# Patient Record
Sex: FEMALE | Race: WHITE | Employment: FULL TIME | URBAN - METROPOLITAN AREA
[De-identification: names, ages, dates, MRNs, and addresses within clinical notes are randomized per-mention and may not be internally consistent; named-entity substitution may affect disease eponyms.]

---

## 2020-03-20 ENCOUNTER — HOSPITAL ENCOUNTER (EMERGENCY)
Age: 70
Discharge: HOME | End: 2020-03-21
Attending: EMERGENCY MEDICINE
Payer: MEDICARE

## 2020-03-20 VITALS
DIASTOLIC BLOOD PRESSURE: 67 MMHG | SYSTOLIC BLOOD PRESSURE: 105 MMHG | HEIGHT: 62 IN | TEMPERATURE: 97.4 F | BODY MASS INDEX: 27.6 KG/M2 | OXYGEN SATURATION: 96 % | WEIGHT: 150 LBS | RESPIRATION RATE: 16 BRPM | HEART RATE: 87 BPM

## 2020-03-20 DIAGNOSIS — S01.01XA LACERATION OF SCALP, INITIAL ENCOUNTER: ICD-10-CM

## 2020-03-20 DIAGNOSIS — S09.90XA INJURY OF HEAD, INITIAL ENCOUNTER: Primary | ICD-10-CM

## 2020-03-20 PROCEDURE — 99282 EMERGENCY DEPT VISIT SF MDM: CPT

## 2020-03-20 PROCEDURE — 90471 IMMUNIZATION ADMIN: CPT

## 2020-03-20 PROCEDURE — 75810000293 HC SIMP/SUPERF WND  RPR

## 2020-03-21 PROCEDURE — 75810000293 HC SIMP/SUPERF WND  RPR

## 2020-03-21 PROCEDURE — 77030008462 HC STPLR SKN PROX J&J -A

## 2020-03-21 PROCEDURE — 77030018836 HC SOL IRR NACL ICUM -A

## 2020-03-21 PROCEDURE — 90471 IMMUNIZATION ADMIN: CPT

## 2020-03-21 PROCEDURE — 74011250636 HC RX REV CODE- 250/636: Performed by: PHYSICIAN ASSISTANT

## 2020-03-21 PROCEDURE — 90715 TDAP VACCINE 7 YRS/> IM: CPT | Performed by: PHYSICIAN ASSISTANT

## 2020-03-21 PROCEDURE — 74011000250 HC RX REV CODE- 250

## 2020-03-21 PROCEDURE — 74011000250 HC RX REV CODE- 250: Performed by: PHYSICIAN ASSISTANT

## 2020-03-21 RX ORDER — LIDOCAINE HYDROCHLORIDE 10 MG/ML
15 INJECTION INFILTRATION; PERINEURAL
Status: COMPLETED | OUTPATIENT
Start: 2020-03-21 | End: 2020-03-21

## 2020-03-21 RX ORDER — LIDOCAINE HYDROCHLORIDE AND EPINEPHRINE 10; 10 MG/ML; UG/ML
INJECTION, SOLUTION INFILTRATION; PERINEURAL
Status: COMPLETED
Start: 2020-03-21 | End: 2020-03-21

## 2020-03-21 RX ORDER — LIDOCAINE HYDROCHLORIDE AND EPINEPHRINE 10; 10 MG/ML; UG/ML
1.5 INJECTION, SOLUTION INFILTRATION; PERINEURAL ONCE
Status: COMPLETED | OUTPATIENT
Start: 2020-03-21 | End: 2020-03-21

## 2020-03-21 RX ORDER — LIDOCAINE HYDROCHLORIDE 10 MG/ML
INJECTION INFILTRATION; PERINEURAL
Status: COMPLETED
Start: 2020-03-21 | End: 2020-03-21

## 2020-03-21 RX ADMIN — LIDOCAINE HYDROCHLORIDE 15 ML: 10 INJECTION INFILTRATION; PERINEURAL at 01:37

## 2020-03-21 RX ADMIN — LIDOCAINE HYDROCHLORIDE AND EPINEPHRINE: 10; 10 INJECTION, SOLUTION INFILTRATION; PERINEURAL at 01:38

## 2020-03-21 RX ADMIN — LIDOCAINE HYDROCHLORIDE 15 ML: 10 INJECTION, SOLUTION INFILTRATION; PERINEURAL at 01:37

## 2020-03-21 RX ADMIN — TETANUS TOXOID, REDUCED DIPHTHERIA TOXOID AND ACELLULAR PERTUSSIS VACCINE, ADSORBED 0.5 ML: 5; 2.5; 8; 8; 2.5 SUSPENSION INTRAMUSCULAR at 01:51

## 2020-03-21 RX ADMIN — LIDOCAINE HYDROCHLORIDE,EPINEPHRINE BITARTRATE: 10; .01 INJECTION, SOLUTION INFILTRATION; PERINEURAL at 01:38

## 2020-03-21 RX ADMIN — Medication 2 ML: at 01:03

## 2020-03-21 NOTE — ED PROVIDER NOTES
EMERGENCY DEPARTMENT HISTORY AND PHYSICAL EXAM    Date: 3/20/2020  Patient Name: Yordan Nance    History of Presenting Illness     Chief Complaint   Patient presents with    Laceration    Fall       History Provided By: Patient    Chief Complaint: head injury     Additional History (Context):   11:20 PM  Yordan Nance is a 81 y.o. female with PMHX HTN  presents to the emergency department C/O laceration to the scalp. Patient states she was walking to get into bed and somehow fell but does not remember how. She is unable to give any details of the fall. She does not believe that she had a loss of consciousness but again would not be able to tell me anything about the fall. She denies any headache but states the back of her head is sore. She denies any chest pain or shortness of breath prior to the episode or currently. Denies any blood thinner use. She states she has been taking Advil today because she felt strange. Tetanus unsure    PCP: Regis Perez MD      Past History     Past Medical History:  History reviewed. No pertinent past medical history. Past Surgical History:  History reviewed. No pertinent surgical history. Family History:  History reviewed. No pertinent family history. Social History:  Social History     Tobacco Use    Smoking status: Never Smoker   Substance Use Topics    Alcohol use: Yes     Comment: socially    Drug use: Never       Allergies: Allergies   Allergen Reactions    Morphine Other (comments)     Pt states cannot tolerate         Review of Systems   Review of Systems   Constitutional: Negative for chills and fever. Eyes: Negative for visual disturbance. Respiratory: Negative for shortness of breath. Cardiovascular: Negative for chest pain. Gastrointestinal: Negative for abdominal pain, diarrhea, nausea and vomiting. Musculoskeletal: Negative for arthralgias, back pain and neck pain. Skin: Positive for wound.    Neurological: Negative for dizziness, syncope, weakness, light-headedness, numbness and headaches. All other systems reviewed and are negative. Physical Exam     Vitals:    03/20/20 2312   BP: 105/67   Pulse: 87   Resp: 16   Temp: 97.4 °F (36.3 °C)   SpO2: 96%   Weight: 68 kg (150 lb)   Height: 5' 2\" (1.575 m)     Physical Exam  Vitals signs and nursing note reviewed. Constitutional:       General: She is not in acute distress. Appearance: She is well-developed. Comments: Alert, oriented, nontoxic, stable gait   HENT:      Head: Normocephalic and atraumatic. Comments: Large hematoma to the posterior scalp with a 2.5 cm laceration overlying, no bony instability, no raccoon eyes or jordan signs     Right Ear: Tympanic membrane, ear canal and external ear normal. Tympanic membrane is not perforated, erythematous, retracted or bulging. Left Ear: Tympanic membrane, ear canal and external ear normal. Tympanic membrane is not perforated, erythematous, retracted or bulging. Nose: Nose normal. No mucosal edema or rhinorrhea. Right Sinus: No maxillary sinus tenderness or frontal sinus tenderness. Left Sinus: No maxillary sinus tenderness or frontal sinus tenderness. Mouth/Throat:      Mouth: Mucous membranes are not dry. Pharynx: Uvula midline. No oropharyngeal exudate, posterior oropharyngeal erythema or uvula swelling. Tonsils: No tonsillar abscesses. Neck:      Musculoskeletal: Normal range of motion and neck supple. Comments: C-spine nontender to palpation, no crepitus or step-off, full range of motion  Cardiovascular:      Rate and Rhythm: Normal rate and regular rhythm. Heart sounds: Normal heart sounds. No murmur. Pulmonary:      Effort: Pulmonary effort is normal. No respiratory distress. Breath sounds: Normal breath sounds. No wheezing or rales. Abdominal:      General: Bowel sounds are normal.      Palpations: Abdomen is soft. Tenderness:  There is no abdominal tenderness. Musculoskeletal:      Comments: Back nontender to palpation, no crepitus or step-off, full range of motion, moving all extremities equally   Lymphadenopathy:      Cervical: No cervical adenopathy. Skin:     General: Skin is warm and dry. Findings: No rash. Neurological:      Mental Status: She is alert and oriented to person, place, and time. Psychiatric:         Judgment: Judgment normal.         Diagnostic Study Results     Labs:   No results found for this or any previous visit (from the past 12 hour(s)). Radiologic Studies:   No orders to display     CT Results  (Last 48 hours)    None        CXR Results  (Last 48 hours)    None          Medical Decision Making   I am the first provider for this patient. I reviewed the vital signs, available nursing notes, past medical history, past surgical history, family history and social history. Vital Signs: Reviewed the patient's vital signs. Pulse Oximetry Analysis: 96% on RA       Records Reviewed: Nursing Notes and Old Medical Records    Procedures:  Wound Repair  Date/Time: 3/21/2020 6:16 PM  Performed by: PAPreparation: skin prepped with Shur-Clens  Pre-procedure re-eval: Immediately prior to the procedure, the patient was reevaluated and found suitable for the planned procedure and any planned medications. Time out: Immediately prior to the procedure a time out was called to verify the correct patient, procedure, equipment, staff and marking as appropriate. .  Location details: scalp  Wound length:2.5 cm or less  Anesthesia: local infiltration    Anesthesia:  Local Anesthetic: lidocaine 1% with epinephrine  Anesthetic total: 9 mL  Foreign bodies: no foreign bodies  Irrigation solution: saline  Irrigation method: syringe  Debridement: none  Skin closure: staples  Number of sutures: 8  Approximation: close  Dressing: 4x4  Patient tolerance: Patient tolerated the procedure well with no immediate complications  My total time at bedside, performing this procedure was 1-15 minutes. ED Course:   11:20 PM Initial assessment performed. The patients presenting problems have been discussed, and they are in agreement with the care plan formulated and outlined with them. I have encouraged them to ask questions as they arise throughout their visit. I informed the pt that She needed further lab evaluation for adequate evaluation for her symptoms, diagnosis, that by refusing workup She is at risk for possible intracranial bleed, MI cardiac arrest, metabolic derangement, anemia, permanent disability death. She is awake, alert, She understands her condition and the risks involved in leaving. Patient is clinically aware of their surroundings and able to ask appropriate questions, the patients patient and the nurse present confirms She is not clinically intoxicated and able to make medical decisions. She verbalized knowing the same risks and understood it was recommended that She stay and could also return at any time. she verbalized that She understood both diagnosis, risks and could return at any time. They were both provided with warnings regarding worsening of her condition and were provided instructions to follow up with Tom Lopez MD tomorrow or return to the Emergency Room as soon as possible. This discussion was witnessed by ЮЛИЯ Morgan. Discussion:  Pt presents with head injury after a fall. Patient states she is unsure of why or how she fell. She had a large hematoma to the posterior scalp with a laceration overlying. No obvious intracranial bleed evidence of skull fracture. Recommended head CT however patient declined. Also recommended labs and EKG to rule out any potential life-threatening causes of syncope since she was unable to give details of why she fell. She is alert and oriented x4 is not intoxicated and is competent to make medical decisions.   Discussed risk versus benefits of declining work-up and imaging, patient understands and still would not like any imaging or blood work. Patient agreeable to staples and tetanus. . Strict return precautions given, pt offering no questions or complaints. Diagnosis and Disposition     DISCHARGE NOTE:  Shaina Root  results have been reviewed with her. She has been counseled regarding her diagnosis, treatment, and plan. She verbally conveys understanding and agreement of the signs, symptoms, diagnosis, treatment and prognosis and additionally agrees to follow up as discussed. She also agrees with the care-plan and conveys that all of her questions have been answered. I have also provided discharge instructions for her that include: educational information regarding their diagnosis and treatment, and list of reasons why they would want to return to the ED prior to their follow-up appointment, should her condition change. She has been provided with education for proper emergency department utilization. CLINICAL IMPRESSION:    1. Injury of head, initial encounter    2. Laceration of scalp, initial encounter        PLAN:  1. D/C Home  2. There are no discharge medications for this patient. 3.   Follow-up Information     Follow up With Specialties Details Why Contact Info    Man Brewer MD Family Practice  staple removal in 5 days [id number] [address]  [phone]      [facility] EMERGENCY DEPT Emergency Medicine  If symptoms worsen [address] [place] [id number]  [phone]          Please note that this dictation was completed with [employer], the computer voice recognition software. Quite often unanticipated grammatical, syntax, homophones, and other interpretive errors are inadvertently transcribed by the computer software. Please disregard these errors. Please excuse any errors that have escaped final proofreading.

## 2022-12-09 NOTE — ED TRIAGE NOTES
Mom asking for appt, believes pt has pink eye.     Pls advise Pt arrives c/o laceration to back of head. Pt states that she fell on carpet and hit her head on it. She denies LOC and any other s/s or complaints.